# Patient Record
Sex: FEMALE | Race: WHITE | NOT HISPANIC OR LATINO | Employment: FULL TIME | URBAN - METROPOLITAN AREA
[De-identification: names, ages, dates, MRNs, and addresses within clinical notes are randomized per-mention and may not be internally consistent; named-entity substitution may affect disease eponyms.]

---

## 2022-05-27 ENCOUNTER — HOSPITAL ENCOUNTER (EMERGENCY)
Facility: HOSPITAL | Age: 22
Discharge: HOME | End: 2022-05-27
Attending: EMERGENCY MEDICINE | Admitting: EMERGENCY MEDICINE

## 2022-05-27 ENCOUNTER — APPOINTMENT (OUTPATIENT)
Dept: GENERAL RADIOLOGY | Facility: HOSPITAL | Age: 22
End: 2022-05-27

## 2022-05-27 VITALS
OXYGEN SATURATION: 99 % | SYSTOLIC BLOOD PRESSURE: 141 MMHG | DIASTOLIC BLOOD PRESSURE: 89 MMHG | HEIGHT: 64 IN | WEIGHT: 175 LBS | BODY MASS INDEX: 29.88 KG/M2 | HEART RATE: 105 BPM | TEMPERATURE: 98.3 F | RESPIRATION RATE: 16 BRPM

## 2022-05-27 DIAGNOSIS — L98.9 FOOT LESION: Primary | ICD-10-CM

## 2022-05-27 PROCEDURE — 73630 X-RAY EXAM OF FOOT: CPT

## 2022-05-27 PROCEDURE — 99282 EMERGENCY DEPT VISIT SF MDM: CPT

## 2022-05-27 RX ORDER — CEPHALEXIN 500 MG/1
500 CAPSULE ORAL 2 TIMES DAILY
Qty: 20 CAPSULE | Refills: 0 | Status: SHIPPED | OUTPATIENT
Start: 2022-05-27 | End: 2022-06-06

## 2022-05-27 NOTE — ED PROVIDER NOTES
"Subjective   Pt is a 22 yo female presenting to ED with complaints of foot pain. Pt denies significant PMHx or daily meds. Pt reports having what she thought was a callus or wart on the bottom of her left foot just below big toe. She had tried home remedies without relief for several months. Then several days ago it worsened with pain, redness and swelling. It started draining pus today. She went to PCP at [facility] today and area was numbness and then area was \"debrided and scraped\". She was sent to ED to rule out foreign body. Pt denies any known injury to foot. She reports the swelling and redness have significantly improved since yesterday. She denies recent antibiotics. She denies fever, chills, numbness or weakness. She has been referred to podiatry but unable to get appointment until early July.       History provided by:  Patient      Review of Systems   Constitutional: Negative for fever.   HENT: Negative for congestion.    Eyes: Negative for visual disturbance.   Respiratory: Negative for cough and shortness of breath.    Cardiovascular: Negative for chest pain and leg swelling.   Gastrointestinal: Negative for abdominal pain, diarrhea, nausea and vomiting.   Genitourinary: Negative for difficulty urinating, dysuria, flank pain and hematuria.   Musculoskeletal: Positive for arthralgias. Negative for back pain and joint swelling.   Skin: Positive for wound. Negative for rash.   Neurological: Negative for dizziness, syncope, weakness, numbness and headaches.   Psychiatric/Behavioral: Negative for confusion.   All other systems reviewed and are negative.      No past medical history on file.    Allergies   Allergen Reactions   • Biaxin [Clarithromycin] Hives       No past surgical history on file.    No family history on file.    Social History     Socioeconomic History   • Marital status: Single           Objective   Physical Exam  Vitals and nursing note reviewed.   Constitutional:       General: She is " not in acute distress.  HENT:      Head: Atraumatic.   Eyes:      Extraocular Movements: Extraocular movements intact.      Conjunctiva/sclera: Conjunctivae normal.   Cardiovascular:      Rate and Rhythm: Tachycardia present.      Pulses: Normal pulses.   Pulmonary:      Effort: Pulmonary effort is normal. No respiratory distress.   Musculoskeletal:         General: Normal range of motion.      Cervical back: Normal range of motion and neck supple.        Feet:    Feet:      Left foot:      Skin integrity: Callus (top layer of skin has been removed, no purulent drainage in ED) present. No erythema.   Skin:     General: Skin is warm.      Capillary Refill: Capillary refill takes less than 2 seconds.   Neurological:      General: No focal deficit present.      Mental Status: She is alert and oriented to person, place, and time.   Psychiatric:         Mood and Affect: Mood normal.         Behavior: Behavior normal.         Procedures           ED Course      Discussed results and tx plan. Due to hx of worsening pain / swelling with purulent drainage and no recent antibiotics, will cover with course of Keflex. Discussed close f/u with PCP and Podiatry. Went over new / worse sx to return to ED.     No results found for this or any previous visit (from the past 24 hour(s)).  Note: In addition to lab results from this visit, the labs listed above may include labs taken at another facility or during a different encounter within the last 24 hours. Please correlate lab times with ED admission and discharge times for further clarification of the services performed during this visit.    XR Foot 3+ View Left   Final Result   1.  No unexpected radiopaque foreign body identified.   2.  No acute osseous process identified.       This report was finalized on 5/27/2022 5:12 PM by Tex Collado MD.            Vitals:    05/27/22 1514   BP: 141/89   BP Location: Left arm   Patient Position: Sitting   Pulse: 105   Resp: 16   Temp:  "98.3 °F (36.8 °C)   TempSrc: Oral   SpO2: 99%   Weight: 79.4 kg (175 lb)   Height: 162.6 cm (64\")     Medications - No data to display  ECG/EMG Results (last 24 hours)     ** No results found for the last 24 hours. **        No orders to display       DISCHARGE    Patient discharged in stable condition.    Reviewed implications of results, diagnosis, meds, responsibility to follow up, warning signs and symptoms of possible worsening, potential complications and reasons to return to ER.    Patient/Family voiced understanding of above instructions.    Discussed plan for discharge, as there is no emergent indication for admission.  Pt/family is agreeable and understands need for follow up and possible repeat testing.  Pt/family is aware that discharge does not mean that nothing is wrong but that it indicates no emergency is currently present that requires admission and they must continue care with follow-up as given below or with a physician of their choice.     FOLLOW-UP    Primary Care Doctor  Schedule an appointment as soon as possible for a visit         Podiatrist  Schedule an appointment as soon as possible for a visit       [facility] Emergency Department  [address] [place] [id number] [phone]    If symptoms worsen         Medication List      New Prescriptions    cephalexin 500 MG capsule  Commonly known as: KEFLEX  Take 1 capsule by mouth 2 (Two) Times a Day for 10 days.           Where to Get Your Medications      These medications were sent to [facility] [email or web address] [address] - [address] - [phone]  - [phone] [place] [address] [facility] [address]    Phone: [phone]   · cephalexin 500 MG capsule                                                  [facility]    Final diagnoses:   Foot lesion       ED Disposition  ED Disposition     ED Disposition   Discharge    Condition   Stable    Comment   --               Primary Care " Doctor  Schedule an appointment as soon as possible for a visit         Podiatrist  Schedule an appointment as soon as possible for a visit       [facility] Emergency Department  [address] [facility] [address] [phone]    If symptoms worsen         Medication List      New Prescriptions    cephalexin 500 MG capsule  Commonly known as: KEFLEX  Take 1 capsule by mouth 2 (Two) Times a Day for 10 days.           Where to Get Your Medications      These medications were sent to [place] [address] [employer] - [phone]  - [phone] [place] [address] [facility] [address]    Phone: [phone]   · cephalexin 500 MG capsule          sIabel Reynoso PA  05/27/22 1204